# Patient Record
Sex: FEMALE | Race: WHITE | Employment: OTHER | URBAN - METROPOLITAN AREA
[De-identification: names, ages, dates, MRNs, and addresses within clinical notes are randomized per-mention and may not be internally consistent; named-entity substitution may affect disease eponyms.]

---

## 2021-05-20 ENCOUNTER — OFFICE VISIT (OUTPATIENT)
Dept: OBGYN CLINIC | Facility: CLINIC | Age: 86
End: 2021-05-20
Payer: MEDICARE

## 2021-05-20 VITALS
HEIGHT: 62 IN | WEIGHT: 132.13 LBS | SYSTOLIC BLOOD PRESSURE: 120 MMHG | BODY MASS INDEX: 24.31 KG/M2 | DIASTOLIC BLOOD PRESSURE: 60 MMHG

## 2021-05-20 DIAGNOSIS — L90.0 LICHEN SCLEROSUS: Primary | ICD-10-CM

## 2021-05-20 PROCEDURE — 87205 SMEAR GRAM STAIN: CPT | Performed by: OBSTETRICS & GYNECOLOGY

## 2021-05-20 PROCEDURE — 99202 OFFICE O/P NEW SF 15 MIN: CPT | Performed by: OBSTETRICS & GYNECOLOGY

## 2021-05-20 PROCEDURE — 87808 TRICHOMONAS ASSAY W/OPTIC: CPT | Performed by: OBSTETRICS & GYNECOLOGY

## 2021-05-20 PROCEDURE — 87106 FUNGI IDENTIFICATION YEAST: CPT | Performed by: OBSTETRICS & GYNECOLOGY

## 2021-05-20 RX ORDER — FUROSEMIDE 20 MG/1
20 TABLET ORAL DAILY
COMMUNITY
Start: 2021-05-04

## 2021-05-20 RX ORDER — LISINOPRIL 10 MG/1
10 TABLET ORAL DAILY
COMMUNITY
Start: 2021-04-30

## 2021-05-20 RX ORDER — CELECOXIB 100 MG/1
100 CAPSULE ORAL DAILY
COMMUNITY
Start: 2021-05-03 | End: 2021-07-28

## 2021-05-20 RX ORDER — OMEPRAZOLE 20 MG/1
CAPSULE, DELAYED RELEASE ORAL DAILY
COMMUNITY
Start: 2021-04-05 | End: 2021-07-01

## 2021-05-20 RX ORDER — CLOBETASOL PROPIONATE 0.5 MG/G
1 CREAM TOPICAL 2 TIMES DAILY
Qty: 1 TUBE | Refills: 0 | Status: SHIPPED | OUTPATIENT
Start: 2021-05-20 | End: 2021-06-01

## 2021-05-20 NOTE — H&P
[facility]  Obstetrics and Gynecology  History & Physical    CC: Patient presents with:  New Patient: new pt c/o \"white\" skin at the opening of vaginal and feeling like skin is tearing       Subjective:     HPI: Seble Gonzalez is a OTHER SURGICAL HISTORY  04/01/1954    throat cyst removed    • OTHER SURGICAL HISTORY  1983    nose surgery to removed skin cancer       Social History:  Social History    Socioeconomic History      Marital status: Single      Spouse name: Not on file complaints  Endocrine:no complaints  Neurological: no complaints  Immunological: no complaints  Musculoskeletal:no complaints      Objective:      05/20/21  1342   BP: 120/60   Weight: 132 lb 1.6 oz (59.9 kg)   Height: 62\"         Body mass index is 24.16 contact me with any questions. Total patient time was 15 minutes in evaluation and management.

## 2021-05-21 RX ORDER — METRONIDAZOLE 7.5 MG/G
1 GEL VAGINAL NIGHTLY
Qty: 1 TUBE | Refills: 0 | Status: SHIPPED | OUTPATIENT
Start: 2021-05-21 | End: 2021-05-26

## 2021-05-21 NOTE — PROGRESS NOTES
Called pt and provided Dr Dimas Griffiths instructions including information of BV and medication. Ordered Metrogel vaginal insert and sent to pharmacy on file. Pt verbalized understanding.

## 2021-05-25 ENCOUNTER — OFFICE VISIT (OUTPATIENT)
Dept: OBGYN CLINIC | Facility: CLINIC | Age: 86
End: 2021-05-25
Payer: MEDICARE

## 2021-05-25 VITALS
BODY MASS INDEX: 23.74 KG/M2 | SYSTOLIC BLOOD PRESSURE: 138 MMHG | DIASTOLIC BLOOD PRESSURE: 76 MMHG | HEIGHT: 62 IN | WEIGHT: 129 LBS

## 2021-05-25 DIAGNOSIS — L90.0 LICHEN SCLEROSUS: Primary | ICD-10-CM

## 2021-05-25 PROCEDURE — 99213 OFFICE O/P EST LOW 20 MIN: CPT | Performed by: OBSTETRICS & GYNECOLOGY

## 2021-05-25 NOTE — PROGRESS NOTES
[facility] [facility]  Obstetrics and Gynecology  Follow Up    Subjective:     Seble Gonzalez is a 80year old  female who was last seen in office 21 and presents with c/o following up prior to going on vacation.  The patinery after short course of twice daily clobetasol.  -Examination reveals improvement as well.  -Patient is going on vacation until the first week of July.   I recommended follow-up immediately after vacation and if still persisting will perform biopsy at that ti

## 2021-05-26 ENCOUNTER — TELEPHONE (OUTPATIENT)
Dept: OBGYN CLINIC | Facility: CLINIC | Age: 86
End: 2021-05-26

## 2021-05-26 NOTE — TELEPHONE ENCOUNTER
Telephone call:   Called patient to discuss symptoms of burning with MetroGel. Patient stated that on her sixth night of using the metronidazole gel she noted that it was burning in her vagina.   She noted that it has since improved overnight and she does

## 2021-05-26 NOTE — TELEPHONE ENCOUNTER
Pt was told to keep applying ointment to vaginal area but when she used it last night, she experienced severe irritation.

## 2021-05-26 NOTE — TELEPHONE ENCOUNTER
Called pt and c/o burning vaginal area, better this am placed clobetasol did have some help with vaseline. Reviewed chart on Metronidazole, started on 05/21/21 did not cause issue until last night, burning in the vaginal area denies itching, rash.   Offe

## 2021-05-27 NOTE — TELEPHONE ENCOUNTER
Kia Dang MD  You 14 hours ago (5:27 PM)     Called and spoke with the patient. Recommended to stop Metrogel and continue the clobetasol. Thank you.      Dr. Alexandrea Reese     Message text

## 2021-06-01 ENCOUNTER — TELEPHONE (OUTPATIENT)
Dept: OBGYN CLINIC | Facility: CLINIC | Age: 86
End: 2021-06-01

## 2021-06-01 RX ORDER — CLOBETASOL PROPIONATE 0.5 MG/G
CREAM TOPICAL
Qty: 15 G | Refills: 0 | Status: SHIPPED | OUTPATIENT
Start: 2021-06-01 | End: 2021-06-15

## 2021-06-01 NOTE — TELEPHONE ENCOUNTER
Clobetasol Propionate 0.05 % External Cream 1 Tube 0 5/20/2021 6/19/2021    Sig - Route: Apply 1 Application topically 2 (two) times daily. - Topical    Sent to pharmacy as: Clobetasol Propionate 0.05 % External Cream (TEMOVATE)    E-Prescribing Status: Re

## 2021-06-01 NOTE — TELEPHONE ENCOUNTER
Clobetasol Propionate  0.05% cream 15mg is requesting the refill for this medication since she is leaving out of town tomorrow and would want to have it picked up today. She is calling to see if the medication was sent over.  The [facility] on file is the otto

## 2021-06-15 ENCOUNTER — TELEPHONE (OUTPATIENT)
Dept: OBGYN CLINIC | Facility: CLINIC | Age: 86
End: 2021-06-15

## 2021-06-15 RX ORDER — CLOBETASOL PROPIONATE 0.5 MG/G
CREAM TOPICAL
Qty: 15 G | Refills: 0 | Status: SHIPPED | OUTPATIENT
Start: 2021-06-15

## 2021-06-15 NOTE — TELEPHONE ENCOUNTER
This RN called pt and informed her that med was Rf as requested as per TIM's notes below, pt to continue to use cream. Pt verbalized understanding and agrees with POC.     Per TIM's notes from 21: \"Madelyn Moorelink Bobocal is a 80year old  female who was

## 2021-06-15 NOTE — TELEPHONE ENCOUNTER
Patient said that the pharmacy sent over a fax with the request of medication refills. Can someone follow up with her. Can you call patient to the following number since she is in [place].

## 2021-07-01 ENCOUNTER — TELEPHONE (OUTPATIENT)
Dept: OBGYN CLINIC | Facility: CLINIC | Age: 86
End: 2021-07-01

## 2021-07-01 RX ORDER — OMEPRAZOLE 20 MG/1
20 CAPSULE, DELAYED RELEASE ORAL DAILY
Qty: 30 CAPSULE | Refills: 0 | Status: SHIPPED | OUTPATIENT
Start: 2021-07-01 | End: 2021-07-09

## 2021-07-01 NOTE — TELEPHONE ENCOUNTER
Pt is requesting a refill for Omeprazole 20 MG to be sent to the [facility] in [facility].

## 2021-07-01 NOTE — TELEPHONE ENCOUNTER
omeprazole 20 MG Oral Capsule Delayed Release   4/5/2021     Sig - Route: Take by mouth daily. - Oral    Class: Historical      Called pt and PCP retied and needs above medication. Scheduled pt with TIM for f/u July 7. Pt verbalized understanding.

## 2021-07-07 ENCOUNTER — OFFICE VISIT (OUTPATIENT)
Dept: OBGYN CLINIC | Facility: CLINIC | Age: 86
End: 2021-07-07
Payer: MEDICARE

## 2021-07-07 VITALS
WEIGHT: 127 LBS | BODY MASS INDEX: 23.37 KG/M2 | HEIGHT: 62 IN | SYSTOLIC BLOOD PRESSURE: 138 MMHG | DIASTOLIC BLOOD PRESSURE: 60 MMHG

## 2021-07-07 DIAGNOSIS — L90.0 LICHEN SCLEROSUS: Primary | ICD-10-CM

## 2021-07-07 PROCEDURE — 99213 OFFICE O/P EST LOW 20 MIN: CPT | Performed by: OBSTETRICS & GYNECOLOGY

## 2021-07-07 NOTE — PROGRESS NOTES
[address] [facility]  Obstetrics and Gynecology  Follow Up    Subjective:     Asif Yeager is a 80year old  female who was last seen in office 2021 and presents with c/o here for follow up for perineal lichen sclerosis.  Belkis Urbina clobetasol to the posterior fourchette   - Follow up in 6 mo. All of the findings and plan were discussed with the patient. She notes understanding and agrees with the plan of care. All questions were answered to the best of my ability at this time.

## 2021-07-09 RX ORDER — OMEPRAZOLE 20 MG/1
CAPSULE, DELAYED RELEASE ORAL
Qty: 90 CAPSULE | Refills: 0 | Status: SHIPPED | OUTPATIENT
Start: 2021-07-09 | End: 2021-08-02

## 2021-07-09 NOTE — TELEPHONE ENCOUNTER
Say Navarro MD  [facility] Clinical Staff 47 minutes ago (12:39 PM)     Please call patient and encourage her to schedule a visit and schedule her in visit with Dr. Iwona Dean or Dr. Kayla Martin.  I recommend this as I will not be prescribing her omeprazole and oth

## 2021-07-09 NOTE — TELEPHONE ENCOUNTER
A refill request was received for:  Requested Prescriptions     Pending Prescriptions Disp Refills   • OMEPRAZOLE 20 MG Oral Capsule Delayed Release [Pharmacy Med Name: OMEPRAZOLE 20MG CAPSULES] 90 capsule 0     Sig: TAKE 1 CAPSULE(20 MG) BY MOUTH DAILY

## 2021-07-14 ENCOUNTER — OFFICE VISIT (OUTPATIENT)
Dept: FAMILY MEDICINE CLINIC | Facility: CLINIC | Age: 86
End: 2021-07-14
Payer: MEDICARE

## 2021-07-14 VITALS
HEIGHT: 62 IN | DIASTOLIC BLOOD PRESSURE: 58 MMHG | WEIGHT: 127 LBS | HEART RATE: 85 BPM | SYSTOLIC BLOOD PRESSURE: 116 MMHG | OXYGEN SATURATION: 98 % | BODY MASS INDEX: 23.37 KG/M2

## 2021-07-14 DIAGNOSIS — G89.29 CHRONIC PAIN OF LEFT KNEE: Primary | ICD-10-CM

## 2021-07-14 DIAGNOSIS — C50.911 MALIGNANT NEOPLASM OF RIGHT FEMALE BREAST, UNSPECIFIED ESTROGEN RECEPTOR STATUS, UNSPECIFIED SITE OF BREAST (HCC): ICD-10-CM

## 2021-07-14 DIAGNOSIS — M25.562 CHRONIC PAIN OF LEFT KNEE: Primary | ICD-10-CM

## 2021-07-14 DIAGNOSIS — I10 ESSENTIAL HYPERTENSION: ICD-10-CM

## 2021-07-14 DIAGNOSIS — R19.7 DIARRHEA, UNSPECIFIED TYPE: ICD-10-CM

## 2021-07-14 PROBLEM — C50.919 INVASIVE CARCINOMA OF BREAST (HCC): Status: ACTIVE | Noted: 2020-09-18

## 2021-07-14 PROCEDURE — 99204 OFFICE O/P NEW MOD 45 MIN: CPT | Performed by: FAMILY MEDICINE

## 2021-07-14 NOTE — PROGRESS NOTES
HPI:    Patient ID: Renay Stanley is a 80year old female who presents to establish care and for left knee pain & diarrhea. HPI  Left leg pains:  Feels pain on medial aspect of knee and underneath foot. These pains started almost one year ago.    Vannessa Lewis Never Smoker      Smokeless tobacco: Never Used    Vaping Use      Vaping Use: Never used    Substance and Sexual Activity      Alcohol use: Never      Drug use: Never      Sexual activity: Not on file    Other Topics      Concerns:        Not on file    S HENT:      Head: Normocephalic and atraumatic. Eyes:      Extraocular Movements: Extraocular movements intact. Conjunctiva/sclera: Conjunctivae normal.   Cardiovascular:      Rate and Rhythm: Normal rate and regular rhythm.       Heart sounds: Murm furosemide.   -Plan to RTC in 1-2 months for medicare annual visit, and will check labs & baseline EKG at that time.      4. Malignant neoplasm of right female breast, unspecified estrogen receptor status, unspecified site of breast ([facility] [address]  -Recently treated

## 2021-07-15 ENCOUNTER — TELEPHONE (OUTPATIENT)
Dept: FAMILY MEDICINE CLINIC | Facility: CLINIC | Age: 86
End: 2021-07-15

## 2021-07-15 NOTE — TELEPHONE ENCOUNTER
Pt's granddaughter called to relay the list of medications that the pt is currently taking. List provided below:    1. Omeprazole 20 MG  2. Clobetasol Ointment  3. Lisinopril 10 MG  4. Furosemide 20 MG    Over the counter    1. Azo  2.  Centrum Multi-vitami

## 2021-07-22 ENCOUNTER — TELEPHONE (OUTPATIENT)
Dept: FAMILY MEDICINE CLINIC | Facility: CLINIC | Age: 86
End: 2021-07-22

## 2021-07-22 NOTE — TELEPHONE ENCOUNTER
Pt's granddaughter explains that the pt's stool is more loose than the last time she came in. Also, the pt has her former PCP office sending over her medical records.

## 2021-07-22 NOTE — TELEPHONE ENCOUNTER
Spoke to pt over phone. Has had looser stools since last appt. Has had two today. Never any blood. Had abdominal pain yesterday that has resolved. No other associated symptoms. Took peptobismol and didn't help. No changes in diet. Drinking water and tea.  H

## 2021-07-22 NOTE — TELEPHONE ENCOUNTER
Called pt and c/o diarrhea intermittently a week to a half, pt had stomach pain yesterday, eating veggies and cheese sandwich and hx of stool issues. Pt has taken Pepto Bismol.     Denies fever, N/V, receiving antiboitics and currently not stomach pain tod

## 2021-07-26 ENCOUNTER — TELEPHONE (OUTPATIENT)
Dept: FAMILY MEDICINE CLINIC | Facility: CLINIC | Age: 86
End: 2021-07-26

## 2021-07-26 RX ORDER — LISINOPRIL 10 MG/1
10 TABLET ORAL DAILY
Refills: 0 | Status: CANCELLED | OUTPATIENT
Start: 2021-07-26

## 2021-07-26 RX ORDER — OMEPRAZOLE 20 MG/1
20 CAPSULE, DELAYED RELEASE ORAL DAILY
Qty: 90 CAPSULE | Refills: 0 | Status: CANCELLED | OUTPATIENT
Start: 2021-07-26

## 2021-07-27 ENCOUNTER — TELEPHONE (OUTPATIENT)
Dept: FAMILY MEDICINE CLINIC | Facility: CLINIC | Age: 86
End: 2021-07-27

## 2021-07-27 DIAGNOSIS — R91.8 HILAR MASS: ICD-10-CM

## 2021-07-27 DIAGNOSIS — C50.911 MALIGNANT NEOPLASM OF RIGHT FEMALE BREAST, UNSPECIFIED ESTROGEN RECEPTOR STATUS, UNSPECIFIED SITE OF BREAST (HCC): ICD-10-CM

## 2021-07-27 DIAGNOSIS — J90 PLEURAL EFFUSION: Primary | ICD-10-CM

## 2021-07-27 NOTE — TELEPHONE ENCOUNTER
Referral for Dr. Elex Pallas in [facility] (or any of his partners)    Provider Address Phone   David Pisano MD . [place] [id number] [phone]

## 2021-07-27 NOTE — TELEPHONE ENCOUNTER
Called granddaughter and stated that pt went to the ED yesterday was told to f/u with PCP in one to two days and needs referral for Pulmonary oncologist at [facility] in one to two days.   Provided ER f/u with Dr. Evelia Pearce for tomorrow and will obtain referral fo

## 2021-07-28 ENCOUNTER — OFFICE VISIT (OUTPATIENT)
Dept: FAMILY MEDICINE CLINIC | Facility: CLINIC | Age: 86
End: 2021-07-28
Payer: MEDICARE

## 2021-07-28 VITALS
DIASTOLIC BLOOD PRESSURE: 66 MMHG | WEIGHT: 125 LBS | OXYGEN SATURATION: 97 % | HEART RATE: 87 BPM | HEIGHT: 62 IN | BODY MASS INDEX: 23 KG/M2 | SYSTOLIC BLOOD PRESSURE: 120 MMHG

## 2021-07-28 DIAGNOSIS — C50.919 METASTATIC BREAST CANCER (HCC): ICD-10-CM

## 2021-07-28 DIAGNOSIS — I10 ESSENTIAL HYPERTENSION: ICD-10-CM

## 2021-07-28 DIAGNOSIS — R91.8 MASS OF RIGHT LUNG: Primary | ICD-10-CM

## 2021-07-28 PROCEDURE — 99215 OFFICE O/P EST HI 40 MIN: CPT | Performed by: FAMILY MEDICINE

## 2021-07-28 PROCEDURE — 1111F DSCHRG MED/CURRENT MED MERGE: CPT | Performed by: FAMILY MEDICINE

## 2021-07-28 RX ORDER — LORATADINE 10 MG/1
10 TABLET ORAL DAILY
COMMUNITY

## 2021-07-28 RX ORDER — ALBUTEROL SULFATE 90 UG/1
2 AEROSOL, METERED RESPIRATORY (INHALATION) EVERY 4 HOURS PRN
Qty: 18 G | Refills: 0 | Status: SHIPPED | OUTPATIENT
Start: 2021-07-28

## 2021-07-28 RX ORDER — ACETAMINOPHEN 500 MG
500 TABLET ORAL EVERY 6 HOURS PRN
COMMUNITY

## 2021-07-28 NOTE — PROGRESS NOTES
CC:  Patient presents with:  Hospital F/U      HPI: 80year old female here for ED follow-up due to chest pain. Here today with her grandson's wife, Escobar Nagy.    Has been having pain in her chest and under her right arm with shortness of breath since [place] [employer]      Alcohol use: Never      Drug use: Never      Sexual activity: Not on file    Other Topics      Concerns:        Not on file    Social History Narrative      Not on file    Social Determinants of Health  Financial Resource Strain:       Diffic Antibiotics      Vitals:    07/28/21  1305   BP: 120/66   Pulse: 87   SpO2: 97%   Weight: 125 lb (56.7 kg)   Height: 5' 2\" (1.575 m)       Body mass index is 22.86 kg/m².     Physical:  General:  Alert, appropriate, no acute distress   HEENT: supple, right

## 2021-07-29 ENCOUNTER — MED REC SCAN ONLY (OUTPATIENT)
Dept: FAMILY MEDICINE CLINIC | Facility: CLINIC | Age: 86
End: 2021-07-29

## 2021-07-29 RX ORDER — OMEPRAZOLE 20 MG/1
CAPSULE, DELAYED RELEASE ORAL
Qty: 90 CAPSULE | Refills: 0 | OUTPATIENT
Start: 2021-07-29

## 2021-07-29 RX ORDER — INHALER, ASSIST DEVICES
SPACER (EA) MISCELLANEOUS
Qty: 1 EACH | Refills: 1 | Status: SHIPPED | OUTPATIENT
Start: 2021-07-29

## 2021-07-29 NOTE — TELEPHONE ENCOUNTER
Patients [facility] per franco a separate prescription needs to be sent for the spacer for the inhaler

## 2021-07-29 NOTE — TELEPHONE ENCOUNTER
I was paged by the ER on 7/26/2021-patient there for chest pain, shortness of breath, diagnosed with right hilar/lung mass. Acute on chronic heart failure. The ER physician asked me to notify you upon your return.   I have referred her to a pulmonologist

## 2021-08-02 ENCOUNTER — TELEPHONE (OUTPATIENT)
Dept: OBGYN CLINIC | Facility: CLINIC | Age: 86
End: 2021-08-02

## 2021-08-02 RX ORDER — OMEPRAZOLE 20 MG/1
20 CAPSULE, DELAYED RELEASE ORAL DAILY
Qty: 90 CAPSULE | Refills: 0 | Status: SHIPPED | OUTPATIENT
Start: 2021-08-02 | End: 2021-08-02

## 2021-08-02 RX ORDER — OMEPRAZOLE 20 MG/1
20 CAPSULE, DELAYED RELEASE ORAL DAILY
Qty: 90 CAPSULE | Refills: 0 | Status: SHIPPED | OUTPATIENT
Start: 2021-08-02

## 2021-08-02 NOTE — TELEPHONE ENCOUNTER
RN contacted pharmacy. Pharmacy states they did not receive 90 day order sent over 7/9/21. RN resent with MP as PCP. RN notified pt's granddaughter, no other concerns present.

## 2021-08-02 NOTE — TELEPHONE ENCOUNTER
Granddaughter calling for refill.   Sarah Cunha is her last pill  OMEPRAZOLE 20 MG Oral Capsule Delayed Release 90 capsule 0 7/9/2021    Sig:   TAKE 1 CAPSULE(20 MG) BY MOUTH DAILY     Route:   (none)     Note to Pharmacy:   **Patient requests 90 days supply**

## 2021-08-09 RX ORDER — OMEPRAZOLE 20 MG/1
CAPSULE, DELAYED RELEASE ORAL
Qty: 90 CAPSULE | Refills: 0 | OUTPATIENT
Start: 2021-08-09

## 2021-08-10 ENCOUNTER — TELEPHONE (OUTPATIENT)
Dept: FAMILY MEDICINE CLINIC | Facility: CLINIC | Age: 86
End: 2021-08-10

## 2022-03-15 ENCOUNTER — MED REC SCAN ONLY (OUTPATIENT)
Dept: FAMILY MEDICINE CLINIC | Facility: CLINIC | Age: 87
End: 2022-03-15

## 2022-12-02 NOTE — TELEPHONE ENCOUNTER
Called pt and c/o unsure of temp 99.2, uncomfortable a cross chest and pain under shoulder back, always cold, on occasion SOB, cough with wheezing. Breast removed, had surgery 3 months ago (has f/u for 08/2021).       Advised to go to ED due to c/o olayinka
Patient states is having back pain, hand pain and some shortness of breath. Would like to be seen today. Also patient needs some refills.
agree
24